# Patient Record
Sex: FEMALE | Race: WHITE | NOT HISPANIC OR LATINO | Employment: FULL TIME | URBAN - METROPOLITAN AREA
[De-identification: names, ages, dates, MRNs, and addresses within clinical notes are randomized per-mention and may not be internally consistent; named-entity substitution may affect disease eponyms.]

---

## 2021-07-27 PROCEDURE — 99283 EMERGENCY DEPT VISIT LOW MDM: CPT

## 2021-07-28 ENCOUNTER — HOSPITAL ENCOUNTER (EMERGENCY)
Facility: HOSPITAL | Age: 41
Discharge: HOME | End: 2021-07-28
Attending: EMERGENCY MEDICINE | Admitting: EMERGENCY MEDICINE
Payer: COMMERCIAL

## 2021-07-28 VITALS
OXYGEN SATURATION: 99 % | DIASTOLIC BLOOD PRESSURE: 82 MMHG | TEMPERATURE: 99 F | HEIGHT: 68 IN | RESPIRATION RATE: 16 BRPM | SYSTOLIC BLOOD PRESSURE: 142 MMHG | HEART RATE: 86 BPM | WEIGHT: 170 LBS | BODY MASS INDEX: 25.76 KG/M2

## 2021-07-28 DIAGNOSIS — J02.0 STREP PHARYNGITIS: Primary | ICD-10-CM

## 2021-07-28 PROCEDURE — 99284 EMERGENCY DEPT VISIT MOD MDM: CPT | Performed by: EMERGENCY MEDICINE

## 2021-07-28 RX ORDER — AMOXICILLIN 250 MG/1
1000 CAPSULE ORAL ONCE
Status: COMPLETED | OUTPATIENT
Start: 2021-07-28 | End: 2021-07-28

## 2021-07-28 RX ORDER — NAPROXEN 500 MG/1
500 TABLET ORAL 2 TIMES DAILY WITH MEALS
Qty: 20 TABLET | Refills: 0 | Status: SHIPPED | OUTPATIENT
Start: 2021-07-28

## 2021-07-28 RX ORDER — AMOXICILLIN 500 MG/1
1000 CAPSULE ORAL EVERY 24 HOURS
Qty: 20 CAPSULE | Refills: 0 | Status: SHIPPED | OUTPATIENT
Start: 2021-07-28 | End: 2021-08-07

## 2021-07-28 RX ORDER — NAPROXEN 250 MG/1
500 TABLET ORAL ONCE
Status: COMPLETED | OUTPATIENT
Start: 2021-07-28 | End: 2021-07-28

## 2021-07-28 RX ADMIN — AMOXICILLIN 1000 MG: 250 CAPSULE ORAL at 00:57

## 2021-07-28 RX ADMIN — DEXAMETHASONE SODIUM PHOSPHATE 10 MG: 10 INJECTION, SOLUTION INTRAMUSCULAR; INTRAVENOUS at 00:57

## 2021-07-28 RX ADMIN — NAPROXEN 500 MG: 250 TABLET ORAL at 00:57

## 2021-07-28 NOTE — DISCHARGE INSTRUCTIONS
Take the entire course of antibiotics, amoxicillin  This is a 10 day course  You may either take 1 tablet twice a day, or 2 tablets once a day

## 2021-07-28 NOTE — ED PROVIDER NOTES
History  Chief Complaint   Patient presents with    Sore Throat     pt presents with sore throat and painful swallowing since thursday, pt states pain has worsened since and now radiates to the R eye  pt was seen at urgent care yesterday and sent home with abx     80-year-old female presents with sore throat for the past 5 days  She was seen at urgent care, strep swab was negative, diagnosed with postnasal drip, given Sudafed and Flonase  Patient presents, still feeling unwell, states she has subjective fevers, chills, myalgias  States her throat is still sore  She is anterior cervical lymphadenopathy  No cough  No difficulty swallowing  No difficulty breathing, no constriction in her throat  Physical exam is concerning for pus on tonsils consistent with strep throat  Patient is vaccinated against COVID  No known sick contacts  None       History reviewed  No pertinent past medical history  History reviewed  No pertinent surgical history  History reviewed  No pertinent family history  I have reviewed and agree with the history as documented  E-Cigarette/Vaping     E-Cigarette/Vaping Substances     Social History     Tobacco Use    Smoking status: Never Smoker    Smokeless tobacco: Never Used   Substance Use Topics    Alcohol use: Not Currently    Drug use: Not on file       Review of Systems   Constitutional: Positive for chills, fatigue and fever  HENT: Positive for congestion, postnasal drip and sore throat  Negative for dental problem, drooling, ear discharge, ear pain, facial swelling, rhinorrhea, sinus pressure, sinus pain, trouble swallowing and voice change  Respiratory: Negative for cough, shortness of breath and stridor  Cardiovascular: Negative for chest pain  Gastrointestinal: Negative for abdominal pain, nausea and vomiting  Genitourinary: Negative for dysuria and flank pain  Musculoskeletal: Positive for myalgias   Negative for arthralgias, back pain, neck pain and neck stiffness  Skin: Negative for rash  Neurological: Positive for headaches  Negative for weakness, light-headedness and numbness  Hematological: Positive for adenopathy (Anterior cervical lymphadenopathy)  All other systems reviewed and are negative  Physical Exam  Physical Exam  Vitals reviewed  Constitutional:       General: She is not in acute distress  Appearance: She is well-developed  She is ill-appearing  She is not toxic-appearing or diaphoretic  HENT:      Head: Normocephalic and atraumatic  Right Ear: Tympanic membrane normal       Left Ear: Tympanic membrane normal       Nose: Congestion present  Mouth/Throat:      Pharynx: Uvula midline  Oropharyngeal exudate and posterior oropharyngeal erythema present  No uvula swelling  Tonsils: Tonsillar exudate present  No tonsillar abscesses  Eyes:      Conjunctiva/sclera: Conjunctivae normal    Cardiovascular:      Rate and Rhythm: Normal rate and regular rhythm  Pulmonary:      Effort: Pulmonary effort is normal  No respiratory distress  Breath sounds: No stridor  No wheezing or rhonchi  Abdominal:      Palpations: Abdomen is soft  Tenderness: There is no abdominal tenderness  Musculoskeletal:         General: Normal range of motion  Cervical back: Normal range of motion  Lymphadenopathy:      Cervical: Cervical adenopathy (Anterior cervical lymphadenopathy) present  Skin:     General: Skin is warm and dry  Coloration: Skin is not pale  Findings: No rash  Neurological:      Mental Status: She is alert and oriented to person, place, and time  Cranial Nerves: No cranial nerve deficit     Psychiatric:         Behavior: Behavior normal          Vital Signs  ED Triage Vitals   Temperature Pulse Respirations Blood Pressure SpO2   07/28/21 0018 07/28/21 0016 07/28/21 0016 07/28/21 0016 07/28/21 0016   99 °F (37 2 °C) 86 16 142/82 99 %      Temp Source Heart Rate Source Patient Position - Orthostatic VS BP Location FiO2 (%)   07/28/21 0018 07/28/21 0016 07/28/21 0016 07/28/21 0016 --   Oral Monitor Sitting Right arm       Pain Score       07/28/21 0016       Worst Possible Pain           Vitals:    07/28/21 0016   BP: 142/82   Pulse: 86   Patient Position - Orthostatic VS: Sitting         Visual Acuity      ED Medications  Medications   amoxicillin (AMOXIL) capsule 1,000 mg (1,000 mg Oral Given 7/28/21 0057)   dexamethasone oral liquid 10 mg 1 mL (10 mg Oral Given 7/28/21 0057)   naproxen (NAPROSYN) tablet 500 mg (500 mg Oral Given 7/28/21 0057)       Diagnostic Studies  Results Reviewed     None                 No orders to display              Procedures  Procedures         ED Course                                           MDM  Number of Diagnoses or Management Options  Strep pharyngitis  Diagnosis management comments: Tonsillar exudates, anterior cervical lymphadenopathy, fevers, absence of cough  This is consistent by [facility] criteria with strep throat  Patient is offered a strep swab however advised that I will be treating her with antibiotics regardless  Patient declines the need for strep swab at this time  Given Decadron, naproxen, amoxicillin  Advised follow-up with Ear Nose Throat  Patient is from [place] and will follow-up back home  Advised good return precautions in case of worsening condition, difficulty breathing, swelling in her throat, or worsening condition despite treatment with antibiotics  Patient discharged in stable condition        Disposition  Final diagnoses:   Strep pharyngitis     Time reflects when diagnosis was documented in both MDM as applicable and the Disposition within this note     Time User Action Codes Description Comment    7/28/2021 12:46 AM Honey Olivares Add [J02 0] Strep pharyngitis       ED Disposition     ED Disposition Condition Date/Time Comment    Discharge Stable Wed Jul 28, 2021 12:49 AM Francis Rubin discharge to home/self care  Follow-up Information     Follow up With Specialties Details Why Contact Info Additional Information    [address] [facility] Emergency Department Emergency Medicine  If symptoms worsen: severe oain, difficulty breathing, unable to eat/swallow fluid, uncontrollable fever, feel like throat is closing, etc [address] [facility]  [address] [facility], [address] [facility], [place], [place], [address]    Please follow-up with your primary care provider to ensure improvement, and asked for primary care provider for referral to ENT if needed  Discharge Medication List as of 7/28/2021 12:49 AM      START taking these medications    Details   amoxicillin (AMOXIL) 500 mg capsule Take 2 capsules (1,000 mg total) by mouth every 24 hours for 10 days, Starting Wed 7/28/2021, Until Sat 8/7/2021, Normal      naproxen (NAPROSYN) 500 mg tablet Take 1 tablet (500 mg total) by mouth 2 (two) times a day with meals As needed for headache or throat pain, Starting Wed 7/28/2021, Normal           No discharge procedures on file      PDMP Review     None          ED Provider  Electronically Signed by           Belle Samuel,   07/28/21 4505

## 2021-07-30 ENCOUNTER — APPOINTMENT (EMERGENCY)
Dept: CT IMAGING | Facility: HOSPITAL | Age: 41
End: 2021-07-30
Payer: COMMERCIAL

## 2021-07-30 ENCOUNTER — HOSPITAL ENCOUNTER (EMERGENCY)
Facility: HOSPITAL | Age: 41
Discharge: HOME | End: 2021-07-30
Attending: EMERGENCY MEDICINE | Admitting: EMERGENCY MEDICINE
Payer: COMMERCIAL

## 2021-07-30 VITALS
HEART RATE: 68 BPM | RESPIRATION RATE: 16 BRPM | SYSTOLIC BLOOD PRESSURE: 117 MMHG | OXYGEN SATURATION: 99 % | TEMPERATURE: 99.2 F | DIASTOLIC BLOOD PRESSURE: 75 MMHG

## 2021-07-30 DIAGNOSIS — J03.90 ACUTE TONSILLITIS: Primary | ICD-10-CM

## 2021-07-30 LAB
ANION GAP SERPL CALCULATED.3IONS-SCNC: 7 MMOL/L (ref 4–13)
BASOPHILS # BLD AUTO: 0.01 THOUSANDS/ΜL (ref 0–0.1)
BASOPHILS NFR BLD AUTO: 0 % (ref 0–1)
BUN SERPL-MCNC: 12 MG/DL (ref 5–25)
CALCIUM SERPL-MCNC: 8.2 MG/DL (ref 8.3–10.1)
CHLORIDE SERPL-SCNC: 103 MMOL/L (ref 100–108)
CO2 SERPL-SCNC: 28 MMOL/L (ref 21–32)
CREAT SERPL-MCNC: 0.75 MG/DL (ref 0.6–1.3)
EOSINOPHIL # BLD AUTO: 0.01 THOUSAND/ΜL (ref 0–0.61)
EOSINOPHIL NFR BLD AUTO: 0 % (ref 0–6)
ERYTHROCYTE [DISTWIDTH] IN BLOOD BY AUTOMATED COUNT: 12.5 % (ref 11.6–15.1)
EXT PREG TEST URINE: NEGATIVE
EXT. CONTROL ED NAV: NORMAL
GFR SERPL CREATININE-BSD FRML MDRD: 99 ML/MIN/1.73SQ M
GLUCOSE SERPL-MCNC: 106 MG/DL (ref 65–140)
HCT VFR BLD AUTO: 39.2 % (ref 34.8–46.1)
HGB BLD-MCNC: 12.8 G/DL (ref 11.5–15.4)
IMM GRANULOCYTES # BLD AUTO: 0.03 THOUSAND/UL (ref 0–0.2)
IMM GRANULOCYTES NFR BLD AUTO: 1 % (ref 0–2)
LYMPHOCYTES # BLD AUTO: 0.91 THOUSANDS/ΜL (ref 0.6–4.47)
LYMPHOCYTES NFR BLD AUTO: 17 % (ref 14–44)
MCH RBC QN AUTO: 30.6 PG (ref 26.8–34.3)
MCHC RBC AUTO-ENTMCNC: 32.7 G/DL (ref 31.4–37.4)
MCV RBC AUTO: 94 FL (ref 82–98)
MONOCYTES # BLD AUTO: 0.7 THOUSAND/ΜL (ref 0.17–1.22)
MONOCYTES NFR BLD AUTO: 13 % (ref 4–12)
NEUTROPHILS # BLD AUTO: 3.82 THOUSANDS/ΜL (ref 1.85–7.62)
NEUTS SEG NFR BLD AUTO: 69 % (ref 43–75)
NRBC BLD AUTO-RTO: 0 /100 WBCS
PLATELET # BLD AUTO: 211 THOUSANDS/UL (ref 149–390)
PMV BLD AUTO: 9.5 FL (ref 8.9–12.7)
POTASSIUM SERPL-SCNC: 3.3 MMOL/L (ref 3.5–5.3)
RBC # BLD AUTO: 4.18 MILLION/UL (ref 3.81–5.12)
S PYO DNA THROAT QL NAA+PROBE: NORMAL
SODIUM SERPL-SCNC: 138 MMOL/L (ref 136–145)
WBC # BLD AUTO: 5.48 THOUSAND/UL (ref 4.31–10.16)

## 2021-07-30 PROCEDURE — 99284 EMERGENCY DEPT VISIT MOD MDM: CPT | Performed by: PHYSICIAN ASSISTANT

## 2021-07-30 PROCEDURE — 80048 BASIC METABOLIC PNL TOTAL CA: CPT | Performed by: PHYSICIAN ASSISTANT

## 2021-07-30 PROCEDURE — 70491 CT SOFT TISSUE NECK W/DYE: CPT

## 2021-07-30 PROCEDURE — 87651 STREP A DNA AMP PROBE: CPT | Performed by: PHYSICIAN ASSISTANT

## 2021-07-30 PROCEDURE — 99284 EMERGENCY DEPT VISIT MOD MDM: CPT

## 2021-07-30 PROCEDURE — 96374 THER/PROPH/DIAG INJ IV PUSH: CPT

## 2021-07-30 PROCEDURE — 36415 COLL VENOUS BLD VENIPUNCTURE: CPT | Performed by: PHYSICIAN ASSISTANT

## 2021-07-30 PROCEDURE — 86308 HETEROPHILE ANTIBODY SCREEN: CPT | Performed by: PHYSICIAN ASSISTANT

## 2021-07-30 PROCEDURE — 81025 URINE PREGNANCY TEST: CPT | Performed by: PHYSICIAN ASSISTANT

## 2021-07-30 PROCEDURE — 85025 COMPLETE CBC W/AUTO DIFF WBC: CPT | Performed by: PHYSICIAN ASSISTANT

## 2021-07-30 PROCEDURE — 96375 TX/PRO/DX INJ NEW DRUG ADDON: CPT

## 2021-07-30 PROCEDURE — 96361 HYDRATE IV INFUSION ADD-ON: CPT

## 2021-07-30 RX ORDER — DEXAMETHASONE SODIUM PHOSPHATE 10 MG/ML
10 INJECTION, SOLUTION INTRAMUSCULAR; INTRAVENOUS ONCE
Status: COMPLETED | OUTPATIENT
Start: 2021-07-30 | End: 2021-07-30

## 2021-07-30 RX ORDER — KETOROLAC TROMETHAMINE 30 MG/ML
30 INJECTION, SOLUTION INTRAMUSCULAR; INTRAVENOUS ONCE
Status: COMPLETED | OUTPATIENT
Start: 2021-07-30 | End: 2021-07-30

## 2021-07-30 RX ORDER — ONDANSETRON 2 MG/ML
4 INJECTION INTRAMUSCULAR; INTRAVENOUS ONCE
Status: COMPLETED | OUTPATIENT
Start: 2021-07-30 | End: 2021-07-30

## 2021-07-30 RX ORDER — PREDNISONE 1 MG/1
TABLET ORAL
Qty: 21 TABLET | Refills: 0 | Status: SHIPPED | OUTPATIENT
Start: 2021-07-30

## 2021-07-30 RX ORDER — OXYCODONE AND ACETAMINOPHEN 2.5; 325 MG/1; MG/1
1 TABLET ORAL EVERY 4 HOURS PRN
Qty: 16 TABLET | Refills: 0 | Status: SHIPPED | OUTPATIENT
Start: 2021-07-30 | End: 2021-08-09

## 2021-07-30 RX ADMIN — KETOROLAC TROMETHAMINE 30 MG: 30 INJECTION, SOLUTION INTRAMUSCULAR at 07:43

## 2021-07-30 RX ADMIN — DEXAMETHASONE SODIUM PHOSPHATE 10 MG: 10 INJECTION, SOLUTION INTRAMUSCULAR; INTRAVENOUS at 07:46

## 2021-07-30 RX ADMIN — IOHEXOL 85 ML: 350 INJECTION, SOLUTION INTRAVENOUS at 08:08

## 2021-07-30 RX ADMIN — SODIUM CHLORIDE 1000 ML: 0.9 INJECTION, SOLUTION INTRAVENOUS at 07:38

## 2021-07-30 RX ADMIN — ONDANSETRON 4 MG: 2 INJECTION INTRAMUSCULAR; INTRAVENOUS at 07:41

## 2021-07-30 NOTE — Clinical Note
Brenda Stafford was seen and treated in our emergency department on 7/30/2021  Diagnosis:     Meenakshi Hoffmann  may return to work on return date  She may return on this date: 08/06/2021         If you have any questions or concerns, please don't hesitate to call        Marga Garza PA-C    ______________________________           _______________          _______________  Hospital Representative                              Date                                Time

## 2021-07-30 NOTE — ED PROVIDER NOTES
History  Chief Complaint   Patient presents with    Sore Throat     pt co of sore throat that is getting worse, was started on abx but now co of b/l ear pain and increase in pain      59-year-old female presents with sore throat for the past 7 days  She was seen at urgent care, strep swab was negative, diagnosed with postnasal drip, given Sudafed and Flonase  Came to ED to 2 days ago, diagnosed with strep pharyngitis based on clinical symptoms and presence of purulent discharge on tonsils bilaterally  Onset of symptoms reported as 7 days ago  Location is reported as the posterior throat  Quality is reported as increasing pain in throat  Severity reported as moderate  Associated symptoms:  subjective fevers, chills, myalgias  States her throat is still sore  States positive lymph node swelling and pain  No cough  No difficulty swallowing  No difficulty breathing, no constriction in her throat  Modifiers:  Patient was treated with antibiotics without improvement in symptoms  Old charts reviewed:  Patient was seen in the emergency department on 07/20/2021 for evaluation strep pharyngitis      Physical exam is concerning for pus on tonsils consistent with strep throat      Patient is vaccinated against COVID  No known sick contacts  History provided by:  Patient   used: No    Sore Throat  Associated symptoms: adenopathy (Anterior cervical lymphadenopathy), chills, fever, headaches and postnasal drip    Associated symptoms: no abdominal pain, no chest pain, no cough, no drooling, no ear discharge, no ear pain, no neck stiffness, no rash, no rhinorrhea, no shortness of breath, no stridor, no trouble swallowing and no voice change        Prior to Admission Medications   Prescriptions Last Dose Informant Patient Reported?  Taking?   amoxicillin (AMOXIL) 500 mg capsule   No No   Sig: Take 2 capsules (1,000 mg total) by mouth every 24 hours for 10 days   naproxen (NAPROSYN) 500 mg tablet No No   Sig: Take 1 tablet (500 mg total) by mouth 2 (two) times a day with meals As needed for headache or throat pain      Facility-Administered Medications: None       History reviewed  No pertinent past medical history  History reviewed  No pertinent surgical history  History reviewed  No pertinent family history  I have reviewed and agree with the history as documented  E-Cigarette/Vaping     E-Cigarette/Vaping Substances     Social History     Tobacco Use    Smoking status: Never Smoker    Smokeless tobacco: Never Used   Substance Use Topics    Alcohol use: Not Currently    Drug use: Not on file       Review of Systems   Constitutional: Positive for chills, fatigue and fever  HENT: Positive for congestion, postnasal drip and sore throat  Negative for dental problem, drooling, ear discharge, ear pain, facial swelling, rhinorrhea, sinus pressure, sinus pain, trouble swallowing and voice change  Respiratory: Negative for cough, shortness of breath and stridor  Cardiovascular: Negative for chest pain  Gastrointestinal: Negative for abdominal pain, nausea and vomiting  Genitourinary: Negative for dysuria and flank pain  Musculoskeletal: Positive for myalgias  Negative for arthralgias, back pain, neck pain and neck stiffness  Skin: Negative for rash  Neurological: Positive for headaches  Negative for weakness, light-headedness and numbness  Hematological: Positive for adenopathy (Anterior cervical lymphadenopathy)  All other systems reviewed and are negative  Physical Exam  Physical Exam  Vitals and nursing note reviewed  Constitutional:       General: She is not in acute distress  Appearance: She is well-developed  She is ill-appearing  She is not toxic-appearing or diaphoretic  Comments: /75   Pulse 68   Temp 99 2 °F (37 3 °C) (Oral)   Resp 16   SpO2 99%      HENT:      Head: Normocephalic and atraumatic        Right Ear: Tympanic membrane and external ear normal       Left Ear: Tympanic membrane and external ear normal       Nose: Congestion present  Mouth/Throat:      Pharynx: Uvula midline  Oropharyngeal exudate and posterior oropharyngeal erythema present  No uvula swelling  Tonsils: Tonsillar exudate present  No tonsillar abscesses  Eyes:      General:         Right eye: No discharge  Left eye: No discharge  Extraocular Movements: Extraocular movements intact  Conjunctiva/sclera: Conjunctivae normal    Cardiovascular:      Rate and Rhythm: Normal rate and regular rhythm  Pulmonary:      Effort: Pulmonary effort is normal  No respiratory distress  Breath sounds: No stridor  No wheezing or rhonchi  Abdominal:      Palpations: Abdomen is soft  Tenderness: There is no abdominal tenderness  Musculoskeletal:         General: No swelling, tenderness, deformity or signs of injury  Normal range of motion  Cervical back: Normal range of motion and neck supple  No rigidity or tenderness  Lymphadenopathy:      Cervical: Cervical adenopathy (Anterior cervical lymphadenopathy) present  Skin:     General: Skin is warm and dry  Capillary Refill: Capillary refill takes less than 2 seconds  Coloration: Skin is not jaundiced or pale  Findings: No rash  Neurological:      General: No focal deficit present  Mental Status: She is alert and oriented to person, place, and time  Mental status is at baseline  Cranial Nerves: No cranial nerve deficit  Sensory: No sensory deficit        Gait: Gait normal    Psychiatric:         Mood and Affect: Mood normal          Behavior: Behavior normal          Vital Signs  ED Triage Vitals   Temperature Pulse Respirations Blood Pressure SpO2   07/30/21 0657 07/30/21 0657 07/30/21 0657 07/30/21 0657 07/30/21 0657   99 2 °F (37 3 °C) 85 18 128/86 98 %      Temp Source Heart Rate Source Patient Position - Orthostatic VS BP Location FiO2 (%)   07/30/21 0657 07/30/21 0501 -- -- --   Oral Monitor         Pain Score       07/30/21 0743       Worst Possible Pain           Vitals:    07/30/21 0800 07/30/21 0830 07/30/21 0900 07/30/21 0930   BP: 117/81 113/72 120/70 117/75   Pulse: 66 62 60 68         Visual Acuity      ED Medications  Medications   sodium chloride 0 9 % bolus 1,000 mL (0 mL Intravenous Stopped 7/30/21 0934)   ondansetron (ZOFRAN) injection 4 mg (4 mg Intravenous Given 7/30/21 0741)   ketorolac (TORADOL) injection 30 mg (30 mg Intravenous Given 7/30/21 0743)   dexamethasone (PF) (DECADRON) injection 10 mg (10 mg Intravenous Given 7/30/21 0746)   iohexol (OMNIPAQUE) 350 MG/ML injection (SINGLE-DOSE) 85 mL (85 mL Intravenous Given 7/30/21 0808)       Diagnostic Studies  Results Reviewed     Procedure Component Value Units Date/Time    Mononucleosis screen [[id number]]  (Normal) Collected: 07/30/21 0738    Lab Status: Final result Specimen: Blood from Arm, Left Updated: 08/02/21 0723     Monotest Negative    Strep A PCR [[id number]]  (Normal) Collected: 07/30/21 0745    Lab Status: Final result Specimen: Throat Updated: 07/30/21 0832     STREP A PCR None Detected    Basic metabolic panel [[id number]]  (Abnormal) Collected: 07/30/21 0738    Lab Status: Final result Specimen: Blood from Arm, Left Updated: 07/30/21 0758     Sodium 138 mmol/L      Potassium 3 3 mmol/L      Chloride 103 mmol/L      CO2 28 mmol/L      ANION GAP 7 mmol/L      BUN 12 mg/dL      Creatinine 0 75 mg/dL      Glucose 106 mg/dL      Calcium 8 2 mg/dL      eGFR 99 ml/min/1 73sq m     Narrative:      [place] guidelines for Chronic Kidney Disease (CKD):     Stage 1 with normal or high GFR (GFR > 90 mL/min/1 73 square meters)    Stage 2 Mild CKD (GFR = 60-89 mL/min/1 73 square meters)    Stage 3A Moderate CKD (GFR = 45-59 mL/min/1 73 square meters)    Stage 3B Moderate CKD (GFR = 30-44 mL/min/1 73 square meters)    Stage 4 Severe CKD (GFR = 15-29 mL/min/1 73 square meters)   Stage 5 End Stage CKD (GFR <15 mL/min/1 73 square meters)  Note: GFR calculation is accurate only with a steady state creatinine    CBC and differential [[id number]]  (Abnormal) Collected: 07/30/21 0738    Lab Status: Final result Specimen: Blood from Arm, Left Updated: 07/30/21 0749     WBC 5 48 Thousand/uL      RBC 4 18 Million/uL      Hemoglobin 12 8 g/dL      Hematocrit 39 2 %      MCV 94 fL      MCH 30 6 pg      MCHC 32 7 g/dL      RDW 12 5 %      MPV 9 5 fL      Platelets 616 Thousands/uL      nRBC 0 /100 WBCs      Neutrophils Relative 69 %      Immat GRANS % 1 %      Lymphocytes Relative 17 %      Monocytes Relative 13 %      Eosinophils Relative 0 %      Basophils Relative 0 %      Neutrophils Absolute 3 82 Thousands/µL      Immature Grans Absolute 0 03 Thousand/uL      Lymphocytes Absolute 0 91 Thousands/µL      Monocytes Absolute 0 70 Thousand/µL      Eosinophils Absolute 0 01 Thousand/µL      Basophils Absolute 0 01 Thousands/µL     POCT pregnancy, urine [[id number]]  (Normal) Resulted: 07/30/21 0739    Lab Status: Final result Updated: 07/30/21 0739     EXT PREG TEST UR (Ref: Negative) negative     Control valid                 CT soft tissue neck   Final Result by Flori Greenberg MD (07/30 7099)      Tonsillitis  No peritonsillar abscess  Mild adenopathy that is likely reactive  Workstation performed: [id number]                    Procedures  Procedures         ED Course                             SBIRT 22yo+      Most Recent Value   SBIRT (24 yo +)   In order to provide better care to our patients, we are screening all of our patients for alcohol and drug use  Would it be okay to ask you these screening questions?   No Filed at: 07/30/2021 Count includes the [facility]                    [facility]  Number of Diagnoses or Management Options  Acute tonsillitis: established and worsening  Diagnosis management comments: ddx includes but is not limited to bacterial vs viral pharyngitis, tonsillitis, uvulitis, PTA, viral syndrome, post nasal drip  Seasonal allergies, laryngitis, mono, consider but doubt retropharyngeal abscess, epiglottitis, foreign body ingestion  Lab results reviewed  Strep test was negative  Pregnancy test was negative  CBC demonstrates normal white blood cell count 5 4, hemoglobin of 12 hematocrit 39 are normal   No anemia  Basic metabolic panel reviewed, BUN of 12 creatinine 0 7 1 normal   No renal failure  Mono screen currently pending  CT scan of the soft tissue neck images independently visualized interpreted by me  Radiology report reviewed:VISUALIZED BRAIN PARENCHYMA:  No acute intracranial pathology of the visualized brain parenchyma  VISUALIZED ORBITS AND PARANASAL SINUSES:  Orbits are unremarkable   Mild left maxillary and right sphenoid sinus mucosal thickening   Mucous retention cyst in the right maxillary sinus   No air-fluid levels  NASAL CAVITY AND NASOPHARYNX:  Normal      SUPRAHYOID NECK: Enlarged edematous tonsils   No discrete peritonsillar abscess   Normal oral cavity   Normal epiglottis        INFRAHYOID NECK:  Aryepiglottic folds and piriform sinuses are normal   Normal glottis and subglottic airway  THYROID GLAND:  Unremarkable  PAROTID AND SUBMANDIBULAR GLANDS:  Normal      LYMPH NODES: Enlarged bilateral level 2 lymph nodes measuring up to 1 5 cm in short axis that are nonspecific but likely reactive        VASCULAR STRUCTURES:  Normal enhancement of the cervical vasculature  THORACIC INLET:  Lung apices and upper mediastinum are unremarkable  BONY STRUCTURES: No acute fracture or destructive osseous lesion  Amount and/or Complexity of Data Reviewed  Clinical lab tests: ordered and reviewed  Tests in the radiology section of CPT®: ordered and reviewed (CT abdomen pelvis images independently visualized interpreted by me    Radiology report reviewed:FINDINGS:     VISUALIZED BRAIN PARENCHYMA:  No acute intracranial pathology of the visualized brain parenchyma  VISUALIZED ORBITS AND PARANASAL SINUSES:  Orbits are unremarkable   Mild left maxillary and right sphenoid sinus mucosal thickening   Mucous retention cyst in the right maxillary sinus   No air-fluid levels  NASAL CAVITY AND NASOPHARYNX:  Normal      SUPRAHYOID NECK: Enlarged edematous tonsils   No discrete peritonsillar abscess   Normal oral cavity   Normal epiglottis        INFRAHYOID NECK:  Aryepiglottic folds and piriform sinuses are normal   Normal glottis and subglottic airway  THYROID GLAND:  Unremarkable  PAROTID AND SUBMANDIBULAR GLANDS:  Normal      LYMPH NODES: Enlarged bilateral level 2 lymph nodes measuring up to 1 5 cm in short axis that are nonspecific but likely reactive        VASCULAR STRUCTURES:  Normal enhancement of the cervical vasculature  THORACIC INLET:  Lung apices and upper mediastinum are unremarkable  BONY STRUCTURES: No acute fracture or destructive osseous lesion  Impression:      Tonsillitis   No peritonsillar abscess   Mild adenopathy that is likely reactive      )  Discussion of test results with the performing providers: yes  Review and summarize past medical records: yes  Independent visualization of images, tracings, or specimens: yes    Risk of Complications, Morbidity, and/or Mortality  General comments: ED course:  44-year-old female presents the emergency department with 7 days sore throat  Seen in the emergency department 2 days ago, diagnosed with strep pharyngitis and started on antibiotics  Reports no improvement in symptoms and feels like she is getting worse since that time  Clinically on exam has bilateral tonsillar swelling and purulent drainage  No drooling or pooling of secretions  No fevers  Lab evaluation demonstrated normal white blood cell count, negative strep test   CT soft tissue neck demonstrated no peritonsillar abscess but findings consistent with tonsillitis      ED diagnosis:  Acute tonsillitis  Discharge plan  Discussed all test results with patient at bedside  Discussed encourage fluids  Rest, encourage hydration  Discussed use of Tylenol or ibuprofen for fever  Discussed use of steroids  And Percocet for pain  Standard narcotic precautions were given  Follow-up with primary care physician in 3-5 days and ENT in 3-5 days recheck and further evaluation of symptoms  Reviewed reasons to return to ed  Patient verbalized understanding of diagnosis and agreement with discharge plan of care as well as understanding of reasons to return to ed  I have reasonably determine that electronically prescribing a controlled substance would be impractical for the patient to obtain the controlled substance prescribed by electronic prescription or would cause an untimely delay resulting in an adverse impact on the patient's medical condition        Patient was seen during the outbreak of the corona virus epidemic   Resources are limited due to the severity of patient illnesses associated with virus   Testing is also limited at this time   Discussed with patient at the time of this evaluation   Due to the fact that limited resources are available -treatment options are limited  Patient Progress  Patient progress: stable      Disposition  Final diagnoses:   Acute tonsillitis     Time reflects when diagnosis was documented in both MDM as applicable and the Disposition within this note     Time User Action Codes Description Comment    7/30/2021  9:30 AM Richy Ly Add [J03 [id number]] Acute tonsillitis       ED Disposition     ED Disposition Condition Date/Time Comment    Discharge Stable Fri Jul 30, 2021  9:30 AM Saw Estevez discharge to home/self care              Follow-up Information     Follow up With Specialties Details Why Contact Info Additional [address] [facility] Emergency Department Emergency Medicine Go to  If symptoms worsen [place] [id number] [facility] Emergency Department, [address] Rebecca Jean MD Family Medicine Call in 2 days for further evaluation of symptoms [address] [facility]  [address]  [place] [address]  [phone]       Follow-up with primary care physician at home  Call in 2 days for further evaluation of symptoms      Donta Gaston MD Otolaryngology Call in 2 days for further evaluation of symptoms [address] [facility]  [address]             Discharge Medication List as of 7/30/2021  9:38 AM      START taking these medications    Details   oxyCODONE-acetaminophen (PERCOCET) 2 5-325 MG per tablet Take 1 tablet by mouth every 4 (four) hours as needed for moderate pain (throat pain/dx tonsillitis/initial rx ) for up to 10 daysMax Daily Amount: 6 tablets, Starting Fri 7/30/2021, Until Mon 8/9/2021 at 2359, Normal      predniSONE 5 mg tablet 40 mg PO day 1, then 30 mg PO day 2, 20 mg PO day 3, 10 mg PO day 4, 5 mg PO day 5 then stop, Normal         CONTINUE these medications which have NOT CHANGED    Details   amoxicillin (AMOXIL) 500 mg capsule Take 2 capsules (1,000 mg total) by mouth every 24 hours for 10 days, Starting Wed 7/28/2021, Until Sat 8/7/2021, Normal      naproxen (NAPROSYN) 500 mg tablet Take 1 tablet (500 mg total) by mouth 2 (two) times a day with meals As needed for headache or throat pain, Starting Wed 7/28/2021, Normal           No discharge procedures on file      PDMP Review     None          ED Provider  Electronically Signed by           Ashley Gray PA-C  08/02/21 8917

## 2021-08-02 LAB — HETEROPH AB SER QL: NEGATIVE
